# Patient Record
Sex: MALE | Race: BLACK OR AFRICAN AMERICAN | Employment: UNEMPLOYED | URBAN - METROPOLITAN AREA
[De-identification: names, ages, dates, MRNs, and addresses within clinical notes are randomized per-mention and may not be internally consistent; named-entity substitution may affect disease eponyms.]

---

## 2021-06-13 ENCOUNTER — APPOINTMENT (OUTPATIENT)
Dept: CT IMAGING | Age: 66
End: 2021-06-13
Attending: EMERGENCY MEDICINE

## 2021-06-13 ENCOUNTER — HOSPITAL ENCOUNTER (EMERGENCY)
Age: 66
Discharge: HOME | End: 2021-06-13
Attending: EMERGENCY MEDICINE

## 2021-06-13 VITALS
OXYGEN SATURATION: 97 % | HEART RATE: 85 BPM | BODY MASS INDEX: 21.82 KG/M2 | DIASTOLIC BLOOD PRESSURE: 105 MMHG | TEMPERATURE: 98.9 F | HEIGHT: 68 IN | RESPIRATION RATE: 20 BRPM | WEIGHT: 143.96 LBS | SYSTOLIC BLOOD PRESSURE: 186 MMHG

## 2021-06-13 DIAGNOSIS — M54.50 ACUTE BILATERAL LOW BACK PAIN WITHOUT SCIATICA: Primary | ICD-10-CM

## 2021-06-13 PROCEDURE — 74011250637 HC RX REV CODE- 250/637: Performed by: EMERGENCY MEDICINE

## 2021-06-13 PROCEDURE — 96375 TX/PRO/DX INJ NEW DRUG ADDON: CPT

## 2021-06-13 PROCEDURE — 72131 CT LUMBAR SPINE W/O DYE: CPT

## 2021-06-13 PROCEDURE — 96374 THER/PROPH/DIAG INJ IV PUSH: CPT

## 2021-06-13 PROCEDURE — 99284 EMERGENCY DEPT VISIT MOD MDM: CPT

## 2021-06-13 PROCEDURE — 74011250636 HC RX REV CODE- 250/636: Performed by: EMERGENCY MEDICINE

## 2021-06-13 PROCEDURE — 74011000250 HC RX REV CODE- 250: Performed by: EMERGENCY MEDICINE

## 2021-06-13 RX ORDER — KETOROLAC TROMETHAMINE 30 MG/ML
30 INJECTION, SOLUTION INTRAMUSCULAR; INTRAVENOUS ONCE
Status: COMPLETED | OUTPATIENT
Start: 2021-06-13 | End: 2021-06-13

## 2021-06-13 RX ORDER — LIDOCAINE 4 G/100G
1 PATCH TOPICAL EVERY 12 HOURS
Qty: 60 PATCH | Refills: 0 | Status: SHIPPED | OUTPATIENT
Start: 2021-06-13 | End: 2021-07-13

## 2021-06-13 RX ORDER — MORPHINE SULFATE 2 MG/ML
4 INJECTION, SOLUTION INTRAMUSCULAR; INTRAVENOUS
Status: COMPLETED | OUTPATIENT
Start: 2021-06-13 | End: 2021-06-13

## 2021-06-13 RX ORDER — KETOROLAC TROMETHAMINE 10 MG/1
10 TABLET, FILM COATED ORAL
Qty: 20 TABLET | Refills: 0 | Status: SHIPPED | OUTPATIENT
Start: 2021-06-13 | End: 2021-06-18

## 2021-06-13 RX ORDER — LIDOCAINE 4 G/100G
1 PATCH TOPICAL EVERY 24 HOURS
Status: DISCONTINUED | OUTPATIENT
Start: 2021-06-13 | End: 2021-06-13 | Stop reason: HOSPADM

## 2021-06-13 RX ORDER — MORPHINE SULFATE 2 MG/ML
4 INJECTION, SOLUTION INTRAMUSCULAR; INTRAVENOUS
Status: DISCONTINUED | OUTPATIENT
Start: 2021-06-13 | End: 2021-06-13

## 2021-06-13 RX ORDER — METHOCARBAMOL 750 MG/1
750 TABLET, FILM COATED ORAL 4 TIMES DAILY
Qty: 56 TABLET | Refills: 0 | Status: SHIPPED | OUTPATIENT
Start: 2021-06-13 | End: 2021-06-27

## 2021-06-13 RX ORDER — DIAZEPAM 5 MG/1
5 TABLET ORAL
Status: COMPLETED | OUTPATIENT
Start: 2021-06-13 | End: 2021-06-13

## 2021-06-13 RX ORDER — KETOROLAC TROMETHAMINE 30 MG/ML
60 INJECTION, SOLUTION INTRAMUSCULAR; INTRAVENOUS ONCE
Status: DISCONTINUED | OUTPATIENT
Start: 2021-06-13 | End: 2021-06-13

## 2021-06-13 RX ADMIN — MORPHINE SULFATE 4 MG: 2 INJECTION, SOLUTION INTRAMUSCULAR; INTRAVENOUS at 03:07

## 2021-06-13 RX ADMIN — DIAZEPAM 5 MG: 5 TABLET ORAL at 02:16

## 2021-06-13 RX ADMIN — KETOROLAC TROMETHAMINE 30 MG: 30 INJECTION, SOLUTION INTRAMUSCULAR; INTRAVENOUS at 02:16

## 2021-06-13 NOTE — ED PROVIDER NOTES
EMERGENCY DEPARTMENT HISTORY AND PHYSICAL EXAM      Date: 6/13/2021  Patient Name: Kala Spencer    History of Presenting Illness     Chief Complaint   Patient presents with    Back Pain     Patient arrives via EMS for back pain since yesterday, 10/10 pain. Patient was lifting an icebox which caused his pain       History Provided By: Patient    HPI: Kala Spencer, 78 y.o. male  Without significant past medical history presenting today with back pain. The patient says that he was lifting something yesterday and now has been having some left and right-sided back pain. He says he has chronic left-sided leg pain due to an injury several years ago. He has tried Advil for his symptoms but he did not have significant relief. The patient denies any urinary symptoms. He says his pain is 10/10 and arrived with paramedics. Patient notes that he was lifting an ice box when his pain initially started    There are no other complaints, changes, or physical findings at this time. PCP: None    No current facility-administered medications on file prior to encounter. No current outpatient medications on file prior to encounter. Past History     Past Medical History:  No past medical history on file. Past Surgical History:  No past surgical history on file. Family History:  No family history on file.     Social History:  Social History     Tobacco Use    Smoking status: Not on file   Substance Use Topics    Alcohol use: Not on file    Drug use: Not on file       Allergies:  No Known Allergies      Review of Systems   Constitutional: No  fever  Skin: No  rash  HEENT: No  nasal congestion  Resp: No cough  CV: No chest pain  GI: No vomiting  : No dysuria  MSK: No joint pain  Neuro: No numbness  Psych: No anxiety      Physical Exam     Patient Vitals for the past 12 hrs:   Temp Pulse Resp BP SpO2   06/13/21 0153 98.9 °F (37.2 °C) 85 20 (!) 146/85 100 %     General: alert, mild distress, appears uncomfortable  Eyes: EOMI, normal conjunctiva  ENT: moist mucous membranes. Neck: Active, full ROM of neck. Skin: No rashes. no jaundice              Lungs: Equal chest expansion. no respiratory distress. clear to auscultation bilaterally No accessory muscle usage  Heart: regular rate     no peripheral edema   2+ radial pulses and DPs bilaterally  Abd:  non distended soft, nontender. No rebound tenderness. No guarding  Back: Low back tenderness to palpation in the lumbar spine, no step-offs, midline tenderness as well as paraspinal tenderness bilaterally. MSK: Full, active ROM in all 4 extremities. Neuro: Alert and oriented to Person, Place, Time and Situation; normal speech;   Psych: Cooperative with exam; Appropriate mood and affect             Diagnostic Study Results     Labs -   No results found for this or any previous visit (from the past 12 hour(s)). Radiologic Studies -   CT SPINE LUMB WO CONT   Final Result      There is no acute fracture or dislocation. Multilevel canal and foraminal stenoses. CT Results  (Last 48 hours)               06/13/21 0251  CT SPINE LUMB WO CONT Final result    Impression:      There is no acute fracture or dislocation. Multilevel canal and foraminal stenoses. Narrative:  EXAM: CT SPINE LUMB WO CONT   Clinical history: Low back pain   INDICATION: Back pain       COMPARISON: None. TECHNIQUE:   Unenhanced multislice helical CT of the lumbar spine was performed   in the axial plane. Coronal and sagittal reconstructions were obtained. CT   dose reduction was achieved through use of a standardized protocol tailored for   this examination and automatic exposure control for dose modulation. FINDINGS:       Anterolisthesis of L4 on L5. Loss of disc height at L5-S1. No pulmonary nodule   or mass. No acute fracture. Aortic atherosclerotic change. No aneurysm. T12-L1: The spinal canal and neural foramina are widely patent.        L1-L2: The spinal canal and neural foramina are widely patent. L2-L3: Facet arthropathy. Ligament flavum hypertrophy. Disc bulge. Schmorl's   along the superior end plate of L2. Moderate bilateral foraminal stenoses. L3-L4: Disc bulge/osteophyte. Ligamentum flavum hypertrophy facet arthropathy. Severe canal stenosis. Severe bilateral foraminal stenosis. L4-L5: Facet arthropathy. Ligament flavum hypertrophy. Disc bulge. Mild canal   stenosis. Moderate to severe bilateral foraminal stenosis. L5-S1: Complete loss of disc height. Canal is patent. Moderate bilateral   foraminal stenosis. CXR Results  (Last 48 hours)    None          Medical Decision Making   I am the first provider for this patient. I reviewed the vital signs, available nursing notes, past medical history, past surgical history, family history and social history. Vital Signs-Reviewed the patient's vital signs. Patient Vitals for the past 12 hrs:   Temp Pulse Resp BP SpO2   06/13/21 0153 98.9 °F (37.2 °C) 85 20 (!) 146/85 100 %       Pulse Oximetry Analysis - 100% on room air  -  Interpretation: Normal    Provider Notes (Medical Decision Making):     Differential Diagnosis: Herniated disc, muscle spasm, fracture, arthritis    Initial Plan: We will treat the patient with IV analgesics as well as topical analgesics and obtain CT imaging of the lumbar spine to evaluate for acute abnormality. ED Course:   Initial assessment performed. The patients presenting problems have been discussed, and they are in agreement with the care plan formulated and outlined with them. I have encouraged them to ask questions as they arise throughout their visit. ED Course as of Jun 13 0315   Sun Jun 13, 2021   0314 On my interpretation of the patient CT lumbar spine there is no evidence of acute abnormality.     [NW]   [id number] Patient presents today with low back pain, treated with oral, IV, and topical analgesics the patient did have improvement in pain. Will prescribe similar medications for home. Patient does not have any red flag symptoms. Ultimately is discharged with return precautions. [NW]      ED Course User Index  [NW] Dayna Delgado MD       I, Gwendolyn Bernheim, MD, am the attending of record for this patient encounter. Dispo: Discharged. The patient has been re-evaluated and is ready for discharge. Reviewed available results with patient. Counseled patient on diagnosis and care plan. Patient has expressed understanding, and all questions have been answered. Patient agrees with plan and agrees to follow up as recommended, or to return to the ED if their symptoms worsen. Discharge instructions have been provided and explained to the patient, along with reasons to return to the ED. PLAN:  Current Discharge Medication List      START taking these medications    Details   ketorolac (TORADOL) 10 mg tablet Take 1 Tablet by mouth every six (6) hours as needed for Pain for up to 5 days. Qty: 20 Tablet, Refills: 0  Start date: 6/13/2021, End date: 6/18/2021      methocarbamoL (Robaxin-750) 750 mg tablet Take 1 Tablet by mouth four (4) times daily for 14 days. Qty: 56 Tablet, Refills: 0  Start date: 6/13/2021, End date: 6/27/2021      lidocaine 4 % patch 1 Patch by TransDERmal route every twelve (12) hours every twelve (12) hours for 30 days. Qty: 60 Patch, Refills: 0  Start date: 6/13/2021, End date: 7/13/2021           2. Follow-up Information    None       3. Return to ED if worse       Diagnosis     Clinical Impression:   1. Acute bilateral low back pain without sciatica        Attestations:    Gwendolyn Bernheim, MD    Please note that this dictation was completed with [email or web address] the [employer] voice recognition software. Quite often unanticipated grammatical, syntax, homophones, and other interpretive errors are inadvertently transcribed by the computer software. Please disregard these errors.   Please excuse any errors that have escaped final proofreading. Thank you.

## 2021-06-13 NOTE — ED NOTES
Patient discharged, discharge instructions provided to patient and reviewed, all questions answered.  Patient transported to [facility] via wheelchair to await ride